# Patient Record
Sex: MALE | Race: WHITE | NOT HISPANIC OR LATINO
[De-identification: names, ages, dates, MRNs, and addresses within clinical notes are randomized per-mention and may not be internally consistent; named-entity substitution may affect disease eponyms.]

---

## 2017-01-24 ENCOUNTER — APPOINTMENT (OUTPATIENT)
Dept: ORTHOPEDIC SURGERY | Facility: CLINIC | Age: 52
End: 2017-01-24

## 2017-01-24 VITALS
BODY MASS INDEX: 30.06 KG/M2 | SYSTOLIC BLOOD PRESSURE: 136 MMHG | WEIGHT: 210 LBS | DIASTOLIC BLOOD PRESSURE: 86 MMHG | HEIGHT: 70 IN | HEART RATE: 82 BPM

## 2017-01-26 ENCOUNTER — FORM ENCOUNTER (OUTPATIENT)
Age: 52
End: 2017-01-26

## 2017-01-27 ENCOUNTER — APPOINTMENT (OUTPATIENT)
Dept: MRI IMAGING | Facility: CLINIC | Age: 52
End: 2017-01-27

## 2017-01-27 ENCOUNTER — OUTPATIENT (OUTPATIENT)
Dept: OUTPATIENT SERVICES | Facility: HOSPITAL | Age: 52
LOS: 1 days | End: 2017-01-27
Payer: COMMERCIAL

## 2017-01-27 DIAGNOSIS — Z00.8 ENCOUNTER FOR OTHER GENERAL EXAMINATION: ICD-10-CM

## 2017-01-27 PROCEDURE — 73221 MRI JOINT UPR EXTREM W/O DYE: CPT

## 2017-01-31 ENCOUNTER — APPOINTMENT (OUTPATIENT)
Dept: ORTHOPEDIC SURGERY | Facility: CLINIC | Age: 52
End: 2017-01-31

## 2017-01-31 VITALS — HEIGHT: 70 IN | BODY MASS INDEX: 30.06 KG/M2 | WEIGHT: 210 LBS

## 2017-02-01 ENCOUNTER — OUTPATIENT (OUTPATIENT)
Dept: OUTPATIENT SERVICES | Facility: HOSPITAL | Age: 52
LOS: 1 days | End: 2017-02-01
Payer: COMMERCIAL

## 2017-02-01 VITALS
HEART RATE: 78 BPM | HEIGHT: 70 IN | TEMPERATURE: 98 F | DIASTOLIC BLOOD PRESSURE: 84 MMHG | OXYGEN SATURATION: 96 % | RESPIRATION RATE: 16 BRPM | WEIGHT: 210.1 LBS | SYSTOLIC BLOOD PRESSURE: 120 MMHG

## 2017-02-01 DIAGNOSIS — Z01.818 ENCOUNTER FOR OTHER PREPROCEDURAL EXAMINATION: ICD-10-CM

## 2017-02-01 DIAGNOSIS — Z98.890 OTHER SPECIFIED POSTPROCEDURAL STATES: Chronic | ICD-10-CM

## 2017-02-01 DIAGNOSIS — S46.119A STRAIN OF MUSCLE, FASCIA AND TENDON OF LONG HEAD OF BICEPS, UNSPECIFIED ARM, INITIAL ENCOUNTER: ICD-10-CM

## 2017-02-01 DIAGNOSIS — S46.212A STRAIN OF MUSCLE, FASCIA AND TENDON OF OTHER PARTS OF BICEPS, LEFT ARM, INITIAL ENCOUNTER: ICD-10-CM

## 2017-02-01 LAB
HCT VFR BLD CALC: 45 % — SIGNIFICANT CHANGE UP (ref 39–50)
HGB BLD-MCNC: 14.9 G/DL — SIGNIFICANT CHANGE UP (ref 13–17)
MCHC RBC-ENTMCNC: 29 PG — SIGNIFICANT CHANGE UP (ref 27–34)
MCHC RBC-ENTMCNC: 33.2 GM/DL — SIGNIFICANT CHANGE UP (ref 32–36)
MCV RBC AUTO: 87.4 FL — SIGNIFICANT CHANGE UP (ref 80–100)
PLATELET # BLD AUTO: 175 K/UL — SIGNIFICANT CHANGE UP (ref 150–400)
RBC # BLD: 5.14 M/UL — SIGNIFICANT CHANGE UP (ref 4.2–5.8)
RBC # FLD: 12.2 % — SIGNIFICANT CHANGE UP (ref 10.3–14.5)
WBC # BLD: 5.5 K/UL — SIGNIFICANT CHANGE UP (ref 3.8–10.5)
WBC # FLD AUTO: 5.5 K/UL — SIGNIFICANT CHANGE UP (ref 3.8–10.5)

## 2017-02-01 PROCEDURE — 93010 ELECTROCARDIOGRAM REPORT: CPT

## 2017-02-01 PROCEDURE — 85027 COMPLETE CBC AUTOMATED: CPT

## 2017-02-01 PROCEDURE — G0463: CPT

## 2017-02-01 PROCEDURE — 93005 ELECTROCARDIOGRAM TRACING: CPT

## 2017-02-01 PROCEDURE — 36415 COLL VENOUS BLD VENIPUNCTURE: CPT

## 2017-02-01 NOTE — H&P PST ADULT - FAMILY HISTORY
Father  Still living? No  Family history of liver cancer, Age at diagnosis: Age Unknown     Grandparent  Still living? No  Family history of pancreatic cancer, Age at diagnosis: Age Unknown     Mother  Still living? Yes, Estimated age: 71-80  Family history of hypertension, Age at diagnosis: Age Unknown

## 2017-02-01 NOTE — H&P PST ADULT - HISTORY OF PRESENT ILLNESS
this is a 52 y/o male who had an injury 10 days ago; tore biceps tendon; to have repair of same, left side;  not taking anything for pain

## 2017-02-01 NOTE — H&P PST ADULT - NSANTHOSAYNRD_GEN_A_CORE
No. ERICK screening performed.  STOP BANG Legend: 0-2 = LOW Risk; 3-4 = INTERMEDIATE Risk; 5-8 = HIGH Risk

## 2017-02-03 RX ORDER — SODIUM CHLORIDE 9 MG/ML
1000 INJECTION, SOLUTION INTRAVENOUS
Qty: 0 | Refills: 0 | Status: DISCONTINUED | OUTPATIENT
Start: 2017-02-10 | End: 2017-02-25

## 2017-02-03 NOTE — ASU DISCHARGE PLAN (ADULT/PEDIATRIC). - NOTIFY
Numbness, color, or temperature change to extremity/Fever greater than 101/Bleeding that does not stop/Pain not relieved by Medications

## 2017-02-03 NOTE — ASU DISCHARGE PLAN (ADULT/PEDIATRIC). - SPECIAL INSTRUCTIONS
Do not bear any weight on your arm  Call MD for severe pain/fever/chills  Elevate arm using foam block while at home and sling when out of the house  Wiggle fingers and pump fist to prevent stiffness  Pain medication as needed

## 2017-02-03 NOTE — ASU DISCHARGE PLAN (ADULT/PEDIATRIC). - MEDICATION SUMMARY - MEDICATIONS TO TAKE
I will START or STAY ON the medications listed below when I get home from the hospital:    Percocet 7.5/325 325 mg-7.5 mg oral tablet  -- 1 tab(s) by mouth every 4 hours, As Needed MDD:6  -- Caution federal law prohibits the transfer of this drug to any person other  than the person for whom it was prescribed.  May cause drowsiness.  Alcohol may intensify this effect.  Use care when operating dangerous machinery.  This prescription cannot be refilled.  This product contains acetaminophen.  Do not use  with any other product containing acetaminophen to prevent possible liver damage.  Using more of this medication than prescribed may cause serious breathing problems.    -- Indication: For Pain

## 2017-02-03 NOTE — ASU DISCHARGE PLAN (ADULT/PEDIATRIC). - DRESSING FT
For the next 24-48 hours while awake, alternate ice pack 15 minutes on & 15 minutes off Keep dressing clean and dry until office visit

## 2017-02-03 NOTE — ASU DISCHARGE PLAN (ADULT/PEDIATRIC). - ACTIVITY LEVEL
no tub baths/no sports/gym/no heavy lifting/elevate extremity no heavy lifting/no sports/gym/elevate extremity/no weight bearing/no tub baths

## 2017-02-03 NOTE — ASU DISCHARGE PLAN (ADULT/PEDIATRIC). - INSTRUCTIONS
For the next 24-48 hours while awake, alternate ice pack 15 minutes on & 15 minutes off Follow all verbal and written instructions. Take medications as prescribed. DO NOT drive, operate machinery, and/or make important decisions while on prescription pain medication. DO NOT hesitate to call Doctor's office with questions or concerns. Certain prescription pain medication can cause constipation; take a stool softener such as Colace 100mg 3 x a day, to avoid the constipating effects of prescription pain medication.

## 2017-02-10 ENCOUNTER — OUTPATIENT (OUTPATIENT)
Dept: OUTPATIENT SERVICES | Facility: HOSPITAL | Age: 52
LOS: 1 days | End: 2017-02-10
Payer: COMMERCIAL

## 2017-02-10 ENCOUNTER — APPOINTMENT (OUTPATIENT)
Dept: ORTHOPEDIC SURGERY | Facility: HOSPITAL | Age: 52
End: 2017-02-10

## 2017-02-10 VITALS
WEIGHT: 206.57 LBS | RESPIRATION RATE: 14 BRPM | HEART RATE: 61 BPM | TEMPERATURE: 98 F | OXYGEN SATURATION: 100 % | SYSTOLIC BLOOD PRESSURE: 129 MMHG | DIASTOLIC BLOOD PRESSURE: 85 MMHG | HEIGHT: 70 IN

## 2017-02-10 VITALS
SYSTOLIC BLOOD PRESSURE: 121 MMHG | RESPIRATION RATE: 18 BRPM | DIASTOLIC BLOOD PRESSURE: 66 MMHG | HEART RATE: 66 BPM | OXYGEN SATURATION: 99 %

## 2017-02-10 DIAGNOSIS — S46.212A STRAIN OF MUSCLE, FASCIA AND TENDON OF OTHER PARTS OF BICEPS, LEFT ARM, INITIAL ENCOUNTER: ICD-10-CM

## 2017-02-10 DIAGNOSIS — N39.0 URINARY TRACT INFECTION, SITE NOT SPECIFIED: Chronic | ICD-10-CM

## 2017-02-10 DIAGNOSIS — Z98.890 OTHER SPECIFIED POSTPROCEDURAL STATES: Chronic | ICD-10-CM

## 2017-02-10 DIAGNOSIS — S83.511A SPRAIN OF ANTERIOR CRUCIATE LIGAMENT OF RIGHT KNEE, INITIAL ENCOUNTER: ICD-10-CM

## 2017-02-10 PROCEDURE — 24342 REPAIR OF RUPTURED TENDON: CPT | Mod: LT

## 2017-02-10 PROCEDURE — 76000 FLUOROSCOPY <1 HR PHYS/QHP: CPT

## 2017-02-10 PROCEDURE — C1713: CPT

## 2017-02-10 RX ORDER — SODIUM CHLORIDE 9 MG/ML
1000 INJECTION, SOLUTION INTRAVENOUS
Qty: 0 | Refills: 0 | Status: DISCONTINUED | OUTPATIENT
Start: 2017-02-10 | End: 2017-02-13

## 2017-02-10 RX ORDER — ONDANSETRON 8 MG/1
4 TABLET, FILM COATED ORAL ONCE
Qty: 0 | Refills: 0 | Status: DISCONTINUED | OUTPATIENT
Start: 2017-02-10 | End: 2017-02-13

## 2017-02-10 RX ORDER — HYDROMORPHONE HYDROCHLORIDE 2 MG/ML
0.5 INJECTION INTRAMUSCULAR; INTRAVENOUS; SUBCUTANEOUS
Qty: 0 | Refills: 0 | Status: DISCONTINUED | OUTPATIENT
Start: 2017-02-10 | End: 2017-02-13

## 2017-02-10 RX ADMIN — SODIUM CHLORIDE 75 MILLILITER(S): 9 INJECTION, SOLUTION INTRAVENOUS at 09:00

## 2017-02-13 ENCOUNTER — TRANSCRIPTION ENCOUNTER (OUTPATIENT)
Age: 52
End: 2017-02-13

## 2017-02-24 ENCOUNTER — APPOINTMENT (OUTPATIENT)
Dept: ORTHOPEDIC SURGERY | Facility: CLINIC | Age: 52
End: 2017-02-24

## 2017-02-24 VITALS
BODY MASS INDEX: 30.06 KG/M2 | WEIGHT: 210 LBS | HEART RATE: 66 BPM | HEIGHT: 70 IN | SYSTOLIC BLOOD PRESSURE: 130 MMHG | DIASTOLIC BLOOD PRESSURE: 78 MMHG

## 2017-02-24 RX ORDER — AZITHROMYCIN 250 MG/1
250 TABLET, FILM COATED ORAL
Qty: 6 | Refills: 0 | Status: ACTIVE | COMMUNITY
Start: 2016-09-23

## 2017-02-24 RX ORDER — BROMPHENIRAMINE MALEATE, PSEUDOEPHEDRINE HYDROCHLORIDE, 2; 30; 10 MG/5ML; MG/5ML; MG/5ML
30-2-10 SYRUP ORAL
Qty: 200 | Refills: 0 | Status: ACTIVE | COMMUNITY
Start: 2016-09-23

## 2017-03-21 ENCOUNTER — APPOINTMENT (OUTPATIENT)
Dept: ORTHOPEDIC SURGERY | Facility: CLINIC | Age: 52
End: 2017-03-21

## 2017-03-21 VITALS
SYSTOLIC BLOOD PRESSURE: 121 MMHG | DIASTOLIC BLOOD PRESSURE: 75 MMHG | BODY MASS INDEX: 30.06 KG/M2 | WEIGHT: 210 LBS | HEIGHT: 70 IN | HEART RATE: 58 BPM

## 2017-03-21 RX ORDER — OXYCODONE AND ACETAMINOPHEN 7.5; 325 MG/1; MG/1
7.5-325 TABLET ORAL
Qty: 40 | Refills: 0 | Status: ACTIVE | COMMUNITY
Start: 2017-02-10

## 2017-04-13 ENCOUNTER — CHART COPY (OUTPATIENT)
Age: 52
End: 2017-04-13

## 2017-04-19 ENCOUNTER — APPOINTMENT (OUTPATIENT)
Dept: ORTHOPEDIC SURGERY | Facility: CLINIC | Age: 52
End: 2017-04-19

## 2017-04-19 VITALS
SYSTOLIC BLOOD PRESSURE: 132 MMHG | HEIGHT: 70 IN | BODY MASS INDEX: 30.06 KG/M2 | WEIGHT: 210 LBS | DIASTOLIC BLOOD PRESSURE: 83 MMHG | HEART RATE: 63 BPM

## 2017-04-19 DIAGNOSIS — Z47.89 ENCOUNTER FOR OTHER ORTHOPEDIC AFTERCARE: ICD-10-CM

## 2017-05-24 ENCOUNTER — APPOINTMENT (OUTPATIENT)
Dept: ORTHOPEDIC SURGERY | Facility: CLINIC | Age: 52
End: 2017-05-24

## 2017-05-24 VITALS — BODY MASS INDEX: 30.06 KG/M2 | HEIGHT: 70 IN | WEIGHT: 210 LBS

## 2017-05-24 DIAGNOSIS — S46.212A STRAIN OF MUSCLE, FASCIA AND TENDON OF OTHER PARTS OF BICEPS, LEFT ARM, INITIAL ENCOUNTER: ICD-10-CM

## 2019-05-07 PROBLEM — J06.9 ACUTE UPPER RESPIRATORY INFECTION, UNSPECIFIED: Chronic | Status: ACTIVE | Noted: 2017-02-10

## 2019-05-08 ENCOUNTER — APPOINTMENT (OUTPATIENT)
Dept: ORTHOPEDIC SURGERY | Facility: CLINIC | Age: 54
End: 2019-05-08
Payer: COMMERCIAL

## 2019-05-08 VITALS
BODY MASS INDEX: 28.35 KG/M2 | HEART RATE: 82 BPM | SYSTOLIC BLOOD PRESSURE: 120 MMHG | HEIGHT: 70 IN | WEIGHT: 198 LBS | DIASTOLIC BLOOD PRESSURE: 79 MMHG

## 2019-05-08 DIAGNOSIS — M50.30 OTHER CERVICAL DISC DEGENERATION, UNSPECIFIED CERVICAL REGION: ICD-10-CM

## 2019-05-08 DIAGNOSIS — M54.2 CERVICALGIA: ICD-10-CM

## 2019-05-08 PROCEDURE — 72050 X-RAY EXAM NECK SPINE 4/5VWS: CPT

## 2019-05-08 PROCEDURE — 99214 OFFICE O/P EST MOD 30 MIN: CPT | Mod: 25

## 2019-05-08 PROCEDURE — 96372 THER/PROPH/DIAG INJ SC/IM: CPT

## 2019-05-08 RX ORDER — METHOCARBAMOL 500 MG/1
500 TABLET, FILM COATED ORAL 3 TIMES DAILY
Qty: 30 | Refills: 0 | Status: ACTIVE | COMMUNITY
Start: 2019-05-08 | End: 1900-01-01

## 2019-05-08 RX ORDER — MELOXICAM 15 MG/1
15 TABLET ORAL
Qty: 14 | Refills: 0 | Status: ACTIVE | COMMUNITY
Start: 2019-05-08 | End: 1900-01-01

## 2019-05-08 RX ADMIN — KETOROLAC TROMETHAMINE 1 MG/ML: 30 INJECTION, SOLUTION INTRAMUSCULAR; INTRAVENOUS at 00:00

## 2019-05-08 NOTE — HISTORY OF PRESENT ILLNESS
[Acetaminophen] : relieved by acetaminophen [5] : a current pain level of 5/10 [Worsening] : worsening [___ mths] : [unfilled] month(s) ago [de-identified] : twisting turning sleeping driving [de-identified] : Patient is here today for evaluation on her neck pain going on for the past several months no known injury and not medically treated for this condition.\par Works as a , still working now.\par Pain started in the neck 2 months ago, worsened over the past week.

## 2019-05-08 NOTE — DISCUSSION/SUMMARY
[de-identified] : The patient presents with symptoms of neck pain without any radiation into his arms. 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 He works as a . I recommended activity modification and some time off from work but he is not interested in that option today. Offered him an injection of Toradol and he wants to proceed. Under sterile conditions 30 mg Toradol was administered intramuscularly by the NP without incident. Prescribed them methocarbamol and meloxicam as well. Prescription for physical therapy and home exercise program was also provided.I will see him back in 4-6 weeks on as needed basis. If his symptoms persist or worsen an MRI cervical spine can be considered.\par \par The patient was educated regarding their condition, treatment options as well as prescribed course of treatment. \par Risks and benefits as well as alternatives to the proposed treatment were also provided to the patient \par They were given the opportunity to have all their questions answered to their satisfaction.\par \par Vital signs were reviewed with the patient and the patient was instructed to followup with their primary care provider for further management.\par \par Healthy lifestyle recommendations were also made including a tobacco free lifestyle, proper diet, and weight control. [Medication Risks Reviewed] : Medication risks reviewed

## 2019-05-08 NOTE — PHYSICAL EXAM
[Normal] : Gait: normal [UE] : Sensory: Intact in bilateral upper extremities [Bicep] : biceps 2+ and symmetric bilaterally [B.R.] : biceps 2+ and symmetric bilaterally [Tricep] : triceps 2+ and symmetric bilaterally [Richey's Sign] : negative Richey's sign [de-identified] : The pt is awake, alert and oriented to self, place and time, is comfortable and in no acute distress. Gait evaluation reveals a narrow based, non-ataxic, non-antalgic gait. Negative Romberg sign noted. Can heel and toe walk without difficulty. Inspection of the neck, back and upper extremities bilaterally reveals no rashes or ecchymotic lesions. There is no obvious abnormal spinal curvature in the sagittal and coronal planes. No crepitus or instability noted with range of motion of bilateral upper extremities. No joint laxity noted in the upper and lower extremities bilaterally. No atrophy or abnormal movements noted in the upper or lower extremities. No tenderness over the lumbar spine or in the paraspinal, or upper and lower extremity musculature. Minimal CT junction tenderness. There is no swelling noted in the upper or lower extremities bilaterally. No cervical lymphadenopathy noted anteriorly.\par  Negative Neer's sign and Hawkin's sign bilaterally. Negative Spurling's sign bilaterally. In the lumbar spine the patient can forward flex to [default value] and extend[default value] without pain\par Negative Babinski sign and no clonus bilaterally in the upper or lower extremities. [Rad] : radial 2+ and symmetric bilaterally [de-identified] : 4 view cervical spine obtained today demonstrate no significant scoliosis. Normal cervical lordosis. Some loss of disc height seen at C5-6 without significant degenerative changes. Small anterior-inferior osteophyte seen in the C6 vertebral body with no significant loss of disc at C6-7. No dynamic instability between flexion-extension. No obvious acute fractures. [de-identified] : flex chin to chest, ext 30 degrees, right lat rot 40 degrees no pain, left lat rot 30 degrees CT junction pain

## 2021-06-29 NOTE — ASU PREOP CHECKLIST - BP NONINVASIVE DIASTOLIC (MM HG)
06/29/21      Ziggy Arciniega  [address] [place] [address]               Please excuse Ziggy Arciniega from Work.  He has been seen by Dr. Cast at [facility] [place] and may return within the next 2 days without restrictions.         SIGNATURE:_______________________________________,   06/29/21     Wilian Cast M.D.           [address]  [address]  T [phone]  F [phone]  [email or web address]       85

## 2021-08-23 ENCOUNTER — TRANSCRIPTION ENCOUNTER (OUTPATIENT)
Age: 56
End: 2021-08-23

## 2022-04-21 NOTE — H&P PST ADULT - NS NEC GEN PE MLT EXAM PC
Bed: HW01  Expected date: 4/20/22  Expected time:   Means of arrival:   Comments:  [facility]  41 M  Recent Med change/MH eval   No bruits; no thyromegaly or nodules

## 2023-02-22 ENCOUNTER — APPOINTMENT (OUTPATIENT)
Dept: ORTHOPEDIC SURGERY | Facility: CLINIC | Age: 58
End: 2023-02-22
Payer: COMMERCIAL

## 2023-02-22 ENCOUNTER — NON-APPOINTMENT (OUTPATIENT)
Age: 58
End: 2023-02-22

## 2023-02-22 VITALS
HEART RATE: 60 BPM | HEIGHT: 70 IN | WEIGHT: 195 LBS | SYSTOLIC BLOOD PRESSURE: 117 MMHG | BODY MASS INDEX: 27.92 KG/M2 | DIASTOLIC BLOOD PRESSURE: 68 MMHG

## 2023-02-22 DIAGNOSIS — M17.31 UNILATERAL POST-TRAUMATIC OSTEOARTHRITIS, RIGHT KNEE: ICD-10-CM

## 2023-02-22 DIAGNOSIS — Z98.890 OTHER SPECIFIED POSTPROCEDURAL STATES: ICD-10-CM

## 2023-02-22 PROCEDURE — 99203 OFFICE O/P NEW LOW 30 MIN: CPT

## 2023-02-22 PROCEDURE — 73564 X-RAY EXAM KNEE 4 OR MORE: CPT | Mod: RT

## 2024-11-18 NOTE — H&P PST ADULT - NS PRO AD NO ADVANCE DIRECTIVE
[facility] -Phone: [phone] Fax: [phone]   Visit  Discharge Instructions / Physician Orders    DATE: 11/20/2024     Home Care:      SUPPLIES ORDERED THRU:      Wound Location: Left Distal Stump     Cleanse with: Liquid antibacterial soap and water, rinse well      Dressing Orders: Dakins moistened gauze, dry dressing     Frequency: Daily     Additional Orders: Increase protein to diet (meat, cheese, eggs, fish, peanut butter, nuts and beans)    Your next appointment with Wound Care Center is in 2 weeks with Dr. Marquis     (Please note your next appointment above and if you are unable to keep, kindly give a 24 hour notice. Thank you.)  If more than 15 min late we cannot guarantee you will be seen due to clinician schedule  Per Policy, Excessive cancellation will call for dismissal from program.     If you experience any of the following, please call the Wound Care Center during business hours:  [phone]     * Increase in Pain  * Temperature over 101  * Increase in drainage from your wound  * Drainage with a foul odor  * Bleeding  * Increase in swelling  * Need for compression bandage changes due to slippage, breakthrough drainage.     If you need medical attention outside of the business hours of the [facility] Centers please contact your PCP or go to the an urgent care or emergency department     The information contained in the After Visit Summary has been reviewed with me, the patient and/or responsible adult, by my health care provider(s). I had the opportunity to ask questions regarding this information. I have elected to receive;      []After Visit Summary  [x]Comprehensive Discharge Instruction      Patient signature______________________________________Date:________  Electronically signed by Ellis Kaye RN on 11/20/2024 at 10:56 AM  Electronically signed by VAMSI MARQUIS MD on 11/20/2024 at 11:01 AM     
No

## 2025-03-18 NOTE — H&P PST ADULT - PRO INTERPRETER NEED 2
Sun Protection Recommendations:    Unprotected UV exposure to the sun or indoor tanning devices is a known risk  factor for the development of skin cancer.    There is no scientifically validated, safe threshold level of UV exposure from the sun or indoor tanning devices that allows for maximal vitamin D synthesis without  increasing skin cancer risk.    To protect against skin cancer, a comprehensive photoprotective regimen,  including the regular use and proper use of a broad-spectrum sunscreen, is  recommended.     Sunscreen should be broad-spectrum (protecting against UVA and UVB rays), and at least SPF 30.  Sunscreen should be reapplied every 2 hours, or even more frequently if swimming or toweling off.  Make sure sunscreen is water resistant if you will be swimming or perspiring heavily.  Pregnant women should avoid sunscreen containing oxybenzone.      For more information on sunscreen:  https://www.aad.org/media/stats/prevention-and-care/sunscreen-faqs    The American Academy of Dermatology recommends that an adequate amount of vitamin D should be obtained from a healthy diet that includes foods naturally rich in vitamin D, foods/beverages fortified with vitamin D, and/or vitamin D supplements. Vitamin D should not be obtained from unprotected exposure to ultraviolet (UV) radiation.     
English